# Patient Record
Sex: MALE | Race: WHITE | NOT HISPANIC OR LATINO | Employment: FULL TIME | URBAN - METROPOLITAN AREA
[De-identification: names, ages, dates, MRNs, and addresses within clinical notes are randomized per-mention and may not be internally consistent; named-entity substitution may affect disease eponyms.]

---

## 2024-03-26 ENCOUNTER — OFFICE VISIT (OUTPATIENT)
Dept: PRIMARY CARE | Facility: CLINIC | Age: 34
End: 2024-03-26
Payer: COMMERCIAL

## 2024-03-26 VITALS
OXYGEN SATURATION: 100 % | HEIGHT: 70 IN | RESPIRATION RATE: 16 BRPM | BODY MASS INDEX: 29.78 KG/M2 | WEIGHT: 208 LBS | HEART RATE: 61 BPM | DIASTOLIC BLOOD PRESSURE: 78 MMHG | SYSTOLIC BLOOD PRESSURE: 128 MMHG

## 2024-03-26 DIAGNOSIS — Z00.00 HEALTH CARE MAINTENANCE: Primary | ICD-10-CM

## 2024-03-26 PROCEDURE — 99395 PREV VISIT EST AGE 18-39: CPT | Performed by: INTERNAL MEDICINE

## 2024-03-26 PROCEDURE — 1036F TOBACCO NON-USER: CPT | Performed by: INTERNAL MEDICINE

## 2024-03-26 ASSESSMENT — ENCOUNTER SYMPTOMS
PALPITATIONS: 0
HEMATURIA: 0
DYSURIA: 0
CONSTIPATION: 0
NAUSEA: 0
NERVOUS/ANXIOUS: 0
POLYDIPSIA: 0
ARTHRALGIAS: 0
RHINORRHEA: 0
WOUND: 0
SORE THROAT: 0
POLYPHAGIA: 0
WHEEZING: 0
BLOOD IN STOOL: 0
ABDOMINAL PAIN: 0
UNEXPECTED WEIGHT CHANGE: 0
DIARRHEA: 0
SHORTNESS OF BREATH: 0
CHILLS: 0
HEADACHES: 0
FEVER: 0
VOMITING: 0
DYSPHORIC MOOD: 0
FREQUENCY: 0
CHEST TIGHTNESS: 0
COUGH: 0
MYALGIAS: 0
EYE PAIN: 0
DIZZINESS: 0

## 2024-03-26 ASSESSMENT — PATIENT HEALTH QUESTIONNAIRE - PHQ9
SUM OF ALL RESPONSES TO PHQ9 QUESTIONS 1 AND 2: 0
2. FEELING DOWN, DEPRESSED OR HOPELESS: NOT AT ALL
1. LITTLE INTEREST OR PLEASURE IN DOING THINGS: NOT AT ALL

## 2024-03-26 ASSESSMENT — PAIN SCALES - GENERAL: PAINLEVEL: 0-NO PAIN

## 2024-03-26 NOTE — PROGRESS NOTES
"Subjective   Patient ID: Warren Atkinson is a 33 y.o. male who presents for Annual Exam and Employment Physical (Forms provided-[employer]).    HPI     Dental visits- UTD  Eye visits-contacts, eye health normal    Exercise- [employer] a few days a week, bikes and walks  Diet- smoothies for breakfast, chicken and rice for lunch, dinner is protein and veggie    Alcohol use-2 drinks per week  Smoking-none     and [employer]    Contacts  Feels well  No concerns    He does a fitness test in the fall.  No orthopedics issues    Review of Systems   Constitutional:  Negative for chills, fever and unexpected weight change.   HENT:  Negative for congestion, hearing loss, rhinorrhea and sore throat.    Eyes:  Negative for pain and visual disturbance.   Respiratory:  Negative for cough, chest tightness, shortness of breath and wheezing.    Cardiovascular:  Negative for chest pain and palpitations.   Gastrointestinal:  Negative for abdominal pain, blood in stool, constipation, diarrhea, nausea and vomiting.   Endocrine: Negative for cold intolerance, heat intolerance, polydipsia and polyphagia.   Genitourinary:  Negative for dysuria, frequency and hematuria.   Musculoskeletal:  Negative for arthralgias and myalgias.   Skin:  Negative for rash and wound.   Neurological:  Negative for dizziness, syncope and headaches.   Psychiatric/Behavioral:  Negative for dysphoric mood. The patient is not nervous/anxious.        Objective   /78   Pulse 61   Resp 16   Ht 1.778 m (5' 10\")   Wt 94.3 kg (208 lb)   SpO2 100%   BMI 29.84 kg/m²     Physical Exam  Vitals reviewed.   Constitutional:       Appearance: Normal appearance. He is not ill-appearing.   HENT:      Head: Normocephalic and atraumatic.      Right Ear: Tympanic membrane normal.      Left Ear: Tympanic membrane normal.      Nose: Nose normal.      Mouth/Throat:      Mouth: Mucous membranes are moist.      Pharynx: Oropharynx is clear.   Eyes:      Extraocular Movements: Extraocular " movements intact.      Conjunctiva/sclera: Conjunctivae normal.      Pupils: Pupils are equal, round, and reactive to light.   Cardiovascular:      Rate and Rhythm: Normal rate and regular rhythm.   Pulmonary:      Effort: Pulmonary effort is normal.      Breath sounds: Normal breath sounds. No wheezing.   Abdominal:      General: There is no distension.      Palpations: Abdomen is soft. There is no mass.      Tenderness: There is no abdominal tenderness.   Musculoskeletal:         General: No swelling.      Cervical back: Neck supple.   Lymphadenopathy:      Cervical: No cervical adenopathy.   Neurological:      General: No focal deficit present.      Mental Status: He is alert and oriented to person, place, and time.      Gait: Gait normal.   Psychiatric:         Mood and Affect: Mood normal.         Behavior: Behavior normal.         Thought Content: Thought content normal.         Assessment/Plan   Problem List Items Addressed This Visit    None  Visit Diagnoses         Codes    Health care maintenance    -  Primary Z00.00    Relevant Orders    Comprehensive Metabolic Panel    Lipid Panel    CBC             CPE completed.  Advised to keep a heart healthy, low fat  diet with fruits and veggies like Mediterranean diet.  Advised on the importance of exercise and maintaining 150 minutes of exercise per week (30 minutes per day 5 days a week).  Advised on regular eye and dental visits.  Discussed age appropriate cancer screening, immunizations and recommendations given.  Discussed avoiding illicit drugs and tobacco. Advised on appropriate use of alcohol.  Advised to wear seat belt.    Formed completed  No concerns    He will send me his vaccines. 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

## 2024-05-13 ENCOUNTER — TELEPHONE (OUTPATIENT)
Dept: PRIMARY CARE | Facility: CLINIC | Age: 34
End: 2024-05-13
Payer: COMMERCIAL

## 2024-05-13 DIAGNOSIS — M25.519 SHOULDER PAIN, UNSPECIFIED CHRONICITY, UNSPECIFIED LATERALITY: Primary | ICD-10-CM

## 2024-05-13 NOTE — TELEPHONE ENCOUNTER
Called left message informed Dr. Guzman put in referral for orthopedic to see Dr. Charles, gave central scheduling number for him to schedule appointment.

## 2024-05-28 ENCOUNTER — OFFICE VISIT (OUTPATIENT)
Dept: ORTHOPEDIC SURGERY | Facility: CLINIC | Age: 34
End: 2024-05-28
Payer: COMMERCIAL

## 2024-05-28 ENCOUNTER — HOSPITAL ENCOUNTER (OUTPATIENT)
Dept: RADIOLOGY | Facility: CLINIC | Age: 34
Discharge: HOME | End: 2024-05-28
Payer: COMMERCIAL

## 2024-05-28 DIAGNOSIS — M25.519 SHOULDER PAIN, UNSPECIFIED CHRONICITY, UNSPECIFIED LATERALITY: ICD-10-CM

## 2024-05-28 DIAGNOSIS — M25.512 ACUTE PAIN OF LEFT SHOULDER: ICD-10-CM

## 2024-05-28 PROCEDURE — 73030 X-RAY EXAM OF SHOULDER: CPT | Mod: LEFT SIDE | Performed by: RADIOLOGY

## 2024-05-28 PROCEDURE — 73030 X-RAY EXAM OF SHOULDER: CPT | Mod: LT

## 2024-05-28 PROCEDURE — 99213 OFFICE O/P EST LOW 20 MIN: CPT | Performed by: ORTHOPAEDIC SURGERY

## 2024-05-28 PROCEDURE — 20610 DRAIN/INJ JOINT/BURSA W/O US: CPT | Performed by: ORTHOPAEDIC SURGERY

## 2024-05-28 PROCEDURE — 99214 OFFICE O/P EST MOD 30 MIN: CPT | Performed by: ORTHOPAEDIC SURGERY

## 2024-05-28 PROCEDURE — 2500000004 HC RX 250 GENERAL PHARMACY W/ HCPCS (ALT 636 FOR OP/ED): Performed by: ORTHOPAEDIC SURGERY

## 2024-05-28 PROCEDURE — 2500000005 HC RX 250 GENERAL PHARMACY W/O HCPCS: Performed by: ORTHOPAEDIC SURGERY

## 2024-05-28 RX ORDER — MELOXICAM 15 MG/1
15 TABLET ORAL
Qty: 30 TABLET | Refills: 2 | Status: SHIPPED | OUTPATIENT
Start: 2024-05-28

## 2024-05-28 RX ORDER — TRIAMCINOLONE ACETONIDE 40 MG/ML
40 INJECTION, SUSPENSION INTRA-ARTICULAR; INTRAMUSCULAR
Status: COMPLETED | OUTPATIENT
Start: 2024-05-28 | End: 2024-05-28

## 2024-05-28 RX ORDER — LIDOCAINE HYDROCHLORIDE 10 MG/ML
2 INJECTION INFILTRATION; PERINEURAL
Status: COMPLETED | OUTPATIENT
Start: 2024-05-28 | End: 2024-05-28

## 2024-05-28 RX ADMIN — TRIAMCINOLONE ACETONIDE 40 MG: 40 INJECTION, SUSPENSION INTRA-ARTICULAR; INTRAMUSCULAR at 11:07

## 2024-05-28 RX ADMIN — LIDOCAINE HYDROCHLORIDE 2 ML: 10 INJECTION, SOLUTION INFILTRATION; PERINEURAL at 11:07

## 2024-05-28 NOTE — PROGRESS NOTES
History of Present Illness   Patient presents today endorsing left shoulder pain.   The patient localizes the pain superior to the shoulder.  The pain is worse with overhead activity and tends to wake the patient at night.  The patient denies a traumatic injury.    The pain is mild, achy at baseline but exacerbated with activity causing a sharp or acute pain, better with rest.    He works for the [employer], does have a fitness test that needs to be done by September.  Likes to workout.    Past Medical History:   Diagnosis Date    Eczema      Past Surgical History:   Procedure Laterality Date    WISDOM TOOTH EXTRACTION       No current outpatient medications on file.    Review of Systems:   GENERAL: Negative for malaise, significant weight loss, fever  MUSCULOSKELETAL: see HPI  NEURO:  Negative    Physical Examination:  Skin healthy to gross inspection  No ecchymosis, no swelling, no gross atrophy  + Tenderness to palpation over acromioclavicular joint  No tenderness to palpation over biceps tendon  No tenderness to palpation over the cervical spine   ROM:  Full forward flexion  Full external rotation  IR to thoracic spine, symmetric to contralateral side  Strength:  5/5 Resisted elevation  5/5 Resisted external rotation    Negative lift off test   Negative Spurling´s test  Negative Neer and Hawkin´s test  + O´ Vinnie´s test, localizes superiorly  Neurovascular exam normal distally      Imaging:  Radiographs demonstrate healthy joint spaces with no evidence of significant degenerative changes of the glenohumeral joint or fractures, AC joint arthrosis noted    Assessment:  Patient with left shoulder pain, acromioclavicular joint arthrosis     Plan:  Acromioclavicular joint arthrosis was discussed. The pertinent anatomy was reviewed.  The role of CSI / PT/ medications/ activity modification were discussed.  Distal clavicle excision was mentioned for refractory cases.  Mobic prescribed, injection  given discussed MRI if he fails improve lower suspicion for a SLAP tear.    A nonsteroidal anti-inflammatory drug (NSAID) was prescribed.  We reviewed the risks (including gastric issues, renal issues) and benefits of the medication.  We discussed a scheduled course if no adverse reactions to help with swelling / pain.  We discussed that chronic usage should be checked with primary care physician.      L Inj/Asp: L subacromial bursa on 5/28/2024 11:07 AM  Indications: pain  Details: 22 G needle, posterior approach  Medications: 2 mL lidocaine 10 mg/mL (1 %); 40 mg triamcinolone acetonide 40 mg/mL  Outcome: tolerated well, no immediate complications  Procedure, treatment alternatives, risks and benefits explained, specific risks discussed. Consent was given by the patient. Immediately prior to procedure a time out was called to verify the correct patient, procedure, equipment, support staff and site/side marked as required. Patient was prepped and draped in the usual sterile fashion.

## 2024-09-19 ENCOUNTER — HOSPITAL ENCOUNTER (OUTPATIENT)
Dept: RADIOLOGY | Facility: CLINIC | Age: 34
Discharge: HOME | End: 2024-09-19
Payer: COMMERCIAL

## 2024-09-19 ENCOUNTER — OFFICE VISIT (OUTPATIENT)
Dept: ORTHOPEDIC SURGERY | Facility: CLINIC | Age: 34
End: 2024-09-19
Payer: COMMERCIAL

## 2024-09-19 VITALS — WEIGHT: 190 LBS | HEIGHT: 70 IN | BODY MASS INDEX: 27.2 KG/M2

## 2024-09-19 DIAGNOSIS — M25.532 LEFT WRIST PAIN: ICD-10-CM

## 2024-09-19 PROCEDURE — 2500000004 HC RX 250 GENERAL PHARMACY W/ HCPCS (ALT 636 FOR OP/ED): Performed by: ORTHOPAEDIC SURGERY

## 2024-09-19 PROCEDURE — 99214 OFFICE O/P EST MOD 30 MIN: CPT | Performed by: ORTHOPAEDIC SURGERY

## 2024-09-19 PROCEDURE — 73110 X-RAY EXAM OF WRIST: CPT | Mod: LEFT SIDE | Performed by: ORTHOPAEDIC SURGERY

## 2024-09-19 PROCEDURE — 73110 X-RAY EXAM OF WRIST: CPT | Mod: LT

## 2024-09-19 PROCEDURE — 20605 DRAIN/INJ JOINT/BURSA W/O US: CPT | Mod: LT | Performed by: ORTHOPAEDIC SURGERY

## 2024-09-19 PROCEDURE — 3008F BODY MASS INDEX DOCD: CPT | Performed by: ORTHOPAEDIC SURGERY

## 2024-09-19 PROCEDURE — 1036F TOBACCO NON-USER: CPT | Performed by: ORTHOPAEDIC SURGERY

## 2024-09-19 PROCEDURE — 2500000005 HC RX 250 GENERAL PHARMACY W/O HCPCS: Performed by: ORTHOPAEDIC SURGERY

## 2024-09-19 RX ORDER — LIDOCAINE HYDROCHLORIDE 10 MG/ML
1 INJECTION, SOLUTION INFILTRATION; PERINEURAL
Status: COMPLETED | OUTPATIENT
Start: 2024-09-19 | End: 2024-09-19

## 2024-09-19 NOTE — PROGRESS NOTES
M Inj/Asp: L radiocarpal on 9/19/2024 11:17 AM  Indications: pain  Details: 25 G needle, dorsal approach  Medications: 1 mL lidocaine 10 mg/mL (1 %); 10 mg triamcinolone acetonide 10 mg/mL  Outcome: tolerated well, no immediate complications    Left Radiocarpal Joint Injection: It was explained to the patient that the risks of a steroid injection include but are not limited to infection, local skin irritation, skin atrophy, calcification, continued pain and discomfort, elevation of blood sugar, burning, failure to relieve pain, and possible late infection. The patient verbalized good insight and verbalized consent for the injection. It was further explained that post injection discomfort can be alleviated with additional medications, ice, elevation, and rest over the first 24 hours, and these modalities are recommended.     Using aseptic technique, a solution containing 10 mg of Kenalog and 1 mL of 1% lidocaine without epinephrine was injected into the patient´s left radiocarpal joint. This was done by digitally palpating Guido´s tubercle. We then moved 1 cm distal to this bony landmark and palpated the soft spot in the location of the typical 3-4 arthroscopic portal. We then prepped the skin and advanced a 25-gauge needle into the joint. The solution was then administered and the patient tolerated this well. A band-aid was placed. It should be noted that ethyl chloride spray was used to make the injection delivery more comfortable for the patient.  Procedure, treatment alternatives, risks and benefits explained, specific risks discussed. Consent was given by the patient. Immediately prior to procedure a time out was called to verify the correct patient, procedure, equipment, support staff and site/side marked as required. Patient was prepped and draped in the usual sterile fashion.

## 2024-09-19 NOTE — PROGRESS NOTES
History present illness: Patient presents today for evaluation of the left wrist.  He describes return of painful left wrist worsened with positions of wrist extension.  He localizes to the TFCC.  Previous steroid injection intra-articular to the left wrist infiltrating about TFCC about complete relief.      Past medical history: The patient's past medical history, family history, social history, and review of systems were documented on the patient medical intake.  The updated data was reviewed in the electronic medical record.  History is negative except otherwise stated in history of present illness.        Physical examination:  General: Alert and oriented to person, place, and time.  No acute distress and breathing comfortably: Pleasant and cooperative with examination.  HEENT: Head is normocephalic and atraumatic.  Neck: Supple, no visible swelling.  Cardiovascular: No palpable tachycardia  Lungs: No audible wheezing or labored breathing  Abdomen: Nondistended.  Extremities: Evaluation of the left upper extremity finds the patient had palpable radial artery at the wrist with brisk capillary refill to all digits.  Patient has intact sensation to axillary radial median and ulnar nerves.  There are no open wounds.  There are no signs of infection.  There is no evidence of lymphedema or lymphatic streaking.  The patient has supple compartments to left arm forearm and hand.  Tenderness to left wrist about TFCC ECU and FCU.      Radiology:      Assessment: Left wrist pain      Plan: Treatment options were discussed.  Patient elects to trial what worked last time.  He elects for intra-articular steroid injection to the ulnar aspect of left wrist infiltrating about TFCC.  Follow-up with me in 6 weeks.  No x-rays upon return.        Procedure:  M Inj/Asp: L radiocarpal on 9/19/2024 12:47 PM  Indications: pain  Details: 25 G needle, dorsal approach  Medications: 1 mL lidocaine 10 mg/mL (1 %); 10 mg triamcinolone  acetonide 10 mg/mL  Outcome: tolerated well, no immediate complications    Left Radiocarpal Joint Injection: It was explained to the patient that the risks of a steroid injection include but are not limited to infection, local skin irritation, skin atrophy, calcification, continued pain and discomfort, elevation of blood sugar, burning, failure to relieve pain, and possible late infection. The patient verbalized good insight and verbalized consent for the injection. It was further explained that post injection discomfort can be alleviated with additional medications, ice, elevation, and rest over the first 24 hours, and these modalities are recommended.     Using aseptic technique, a solution containing 10 mg of Kenalog and 1 mL of 1% lidocaine without epinephrine was injected into the patient´s left radiocarpal joint. This was done by digitally palpating Guido´s tubercle. We then moved 1 cm distal to this bony landmark and palpated the soft spot in the location of the typical 3-4 arthroscopic portal. We then prepped the skin and advanced a 25-gauge needle into the joint. The solution was then administered and the patient tolerated this well. A band-aid was placed. It should be noted that ethyl chloride spray was used to make the injection delivery more comfortable for the patient.  Procedure, treatment alternatives, risks and benefits explained, specific risks discussed. Consent was given by the patient. Immediately prior to procedure a time out was called to verify the correct patient, procedure, equipment, support staff and site/side marked as required. Patient was prepped and draped in the usual sterile fashion.

## 2024-10-21 ENCOUNTER — OFFICE VISIT (OUTPATIENT)
Dept: URGENT CARE | Age: 34
End: 2024-10-21
Payer: COMMERCIAL

## 2024-10-21 ENCOUNTER — ANCILLARY PROCEDURE (OUTPATIENT)
Dept: URGENT CARE | Age: 34
End: 2024-10-21
Payer: COMMERCIAL

## 2024-10-21 VITALS
TEMPERATURE: 98.2 F | WEIGHT: 190 LBS | BODY MASS INDEX: 27.2 KG/M2 | OXYGEN SATURATION: 99 % | SYSTOLIC BLOOD PRESSURE: 118 MMHG | DIASTOLIC BLOOD PRESSURE: 82 MMHG | HEART RATE: 55 BPM | HEIGHT: 70 IN | RESPIRATION RATE: 18 BRPM

## 2024-10-21 DIAGNOSIS — M79.671 FOOT PAIN, RIGHT: ICD-10-CM

## 2024-10-21 DIAGNOSIS — M77.41 METATARSALGIA OF RIGHT FOOT: ICD-10-CM

## 2024-10-21 DIAGNOSIS — M79.671 FOOT PAIN, RIGHT: Primary | ICD-10-CM

## 2024-10-21 PROCEDURE — 73630 X-RAY EXAM OF FOOT: CPT | Performed by: PHYSICIAN ASSISTANT

## 2024-10-21 PROCEDURE — 1036F TOBACCO NON-USER: CPT | Performed by: PHYSICIAN ASSISTANT

## 2024-10-21 PROCEDURE — 99204 OFFICE O/P NEW MOD 45 MIN: CPT | Performed by: PHYSICIAN ASSISTANT

## 2024-10-21 PROCEDURE — 3008F BODY MASS INDEX DOCD: CPT | Performed by: PHYSICIAN ASSISTANT

## 2024-10-21 RX ORDER — PREDNISONE 20 MG/1
20 TABLET ORAL 2 TIMES DAILY
Qty: 10 TABLET | Refills: 0 | Status: SHIPPED | OUTPATIENT
Start: 2024-10-21 | End: 2024-10-26

## 2024-10-21 ASSESSMENT — PAIN SCALES - GENERAL: PAINLEVEL_OUTOF10: 5

## 2024-10-21 NOTE — PROGRESS NOTES
"Subjective   Patient ID: Warren Atkinson is a 34 y.o. male. They present today with a chief complaint of Foot Pain (Right foot pain 2 weeks on and off. No known injury).    History of Present Illness  This is a 34-year-old male who presents to the urgent care with complaints of right foot pain.  Patient states that is been happening on and off for the past few weeks.  Patient denies any direct injury or trauma to the area.  Patient denies any rash or swelling.  Patient states the pain is located on the ball/plantar aspect of his right foot.  Patient states nothing helps with his symptoms.  Patient denies any other systemic complaints.          Past Medical History  Allergies as of 10/21/2024    (No Known Allergies)       (Not in a hospital admission)       Past Medical History:   Diagnosis Date    Eczema        Past Surgical History:   Procedure Laterality Date    WISDOM TOOTH EXTRACTION          reports that he has never smoked. He has never used smokeless tobacco. He reports current alcohol use of about 3.0 standard drinks of alcohol per week. He reports that he does not use drugs.    Review of Systems  Review of Systems   All other systems reviewed and are negative.                                 Objective    Vitals:    10/21/24 0831   BP: 118/82   BP Location: Left arm   Patient Position: Sitting   BP Cuff Size: Adult   Pulse: 55   Resp: 18   Temp: 36.8 °C (98.2 °F)   TempSrc: Oral   SpO2: 99%   Weight: 86.2 kg (190 lb)   Height: 1.778 m (5' 10\")     No LMP for male patient.    Physical Exam  Vitals and nursing note reviewed.   Constitutional:       Appearance: Normal appearance.   Eyes:      Extraocular Movements: Extraocular movements intact.   Pulmonary:      Comments: No dyspnea  Musculoskeletal:         General: Tenderness present. No swelling, deformity or signs of injury. Normal range of motion.      Cervical back: Neck supple.      Right lower leg: No edema.      Comments: Point tenderness over distal " metatarsals   Skin:     General: Skin is warm and dry.      Capillary Refill: Capillary refill takes less than 2 seconds.   Neurological:      General: No focal deficit present.      Mental Status: He is alert.         Procedures    Point of Care Test & Imaging Results from this visit  No results found for this visit on 10/21/24.   XR foot right 3+ views    Result Date: 10/21/2024  Interpreted By:  Caleb Dewitt, STUDY: XR FOOT RIGHT 3+ VIEWS; ;  10/21/2024 8:52 am   INDICATION: Signs/Symptoms:right foot pain, over distal metatarsals.   COMPARISON: March 2021.   ACCESSION NUMBER(S): [id number]   ORDERING CLINICIAN: LORNA TRIVEDI   FINDINGS: No fractures or destructive lesions are identified. The joint spaces and articular surfaces are maintained. The alignment is anatomic. The soft tissues are unremarkable.       Normal radiographs of the right foot.   Signed by: Caleb Dewitt 10/21/2024 9:01 AM Dictation workstation:   [id number]     Diagnostic study results (if any) were reviewed by Lorna Trivedi PA-C.    Assessment/Plan   Allergies, medications, history, and pertinent labs/EKGs/Imaging reviewed by Lorna Trivedi PA-C.     Medical Decision Making  R foot pain - metatarsalgia - point tenderness over plantar aspect of R foot. - no injury or trauma, +overuse. Xray imaging ordered and was negative for acute fx/dislocation. No focal neuro deficits. No rash or laceration. Anti-inflam prescribed and podiatry referral provided. Care instructions/red flags, return precautions & ADEs discussed when to seek medical attention in clinic vs ER and pt agreed/understood. Follow up with primary care provider in 3-5 days if no improvement.       Orders and Diagnoses  Diagnoses and all orders for this visit:  Foot pain, right  -     XR foot right 3+ views; Future  -     Referral to Podiatry; Future  Metatarsalgia of right foot  -     predniSONE (Deltasone) 20 mg tablet; Take 1 tablet (20 mg) by mouth 2 times a  day for 5 days.      Medical Admin Record      Patient disposition: Home    Electronically signed by Jelena Trivedi PA-C  9:14 AM

## 2024-10-21 NOTE — PATIENT INSTRUCTIONS
Take prednisone as directed, follow up with podiatry, Care instructions/red flags, return precautions & ADEs discussed when to seek medical attention in clinic vs ER and pt agreed/understood. Follow up with primary care provider in 3-5 days if no improvement.

## 2024-10-31 ENCOUNTER — OFFICE VISIT (OUTPATIENT)
Dept: ORTHOPEDIC SURGERY | Facility: CLINIC | Age: 34
End: 2024-10-31
Payer: COMMERCIAL

## 2024-10-31 DIAGNOSIS — M77.8 WRIST TENDONITIS: Primary | ICD-10-CM

## 2024-10-31 PROCEDURE — 20550 NJX 1 TENDON SHEATH/LIGAMENT: CPT | Performed by: ORTHOPAEDIC SURGERY

## 2024-10-31 PROCEDURE — 99213 OFFICE O/P EST LOW 20 MIN: CPT | Performed by: ORTHOPAEDIC SURGERY

## 2024-10-31 PROCEDURE — 99214 OFFICE O/P EST MOD 30 MIN: CPT | Performed by: ORTHOPAEDIC SURGERY

## 2024-10-31 PROCEDURE — 2500000004 HC RX 250 GENERAL PHARMACY W/ HCPCS (ALT 636 FOR OP/ED): Performed by: ORTHOPAEDIC SURGERY

## 2024-10-31 PROCEDURE — 1036F TOBACCO NON-USER: CPT | Performed by: ORTHOPAEDIC SURGERY

## 2024-10-31 RX ORDER — LIDOCAINE HYDROCHLORIDE 10 MG/ML
1 INJECTION, SOLUTION INFILTRATION; PERINEURAL
Status: COMPLETED | OUTPATIENT
Start: 2024-10-31 | End: 2024-10-31

## 2025-01-06 ENCOUNTER — APPOINTMENT (OUTPATIENT)
Dept: ORTHOPEDIC SURGERY | Facility: CLINIC | Age: 35
End: 2025-01-06
Payer: COMMERCIAL

## 2025-01-16 ENCOUNTER — LAB (OUTPATIENT)
Dept: LAB | Facility: LAB | Age: 35
End: 2025-01-16
Payer: COMMERCIAL

## 2025-01-16 DIAGNOSIS — Z00.00 HEALTH CARE MAINTENANCE: ICD-10-CM

## 2025-01-16 LAB
ALBUMIN SERPL BCP-MCNC: 4.8 G/DL (ref 3.4–5)
ALP SERPL-CCNC: 37 U/L (ref 33–120)
ALT SERPL W P-5'-P-CCNC: 21 U/L (ref 10–52)
ANION GAP SERPL CALC-SCNC: 12 MMOL/L (ref 10–20)
AST SERPL W P-5'-P-CCNC: 24 U/L (ref 9–39)
BILIRUB SERPL-MCNC: 0.6 MG/DL (ref 0–1.2)
BUN SERPL-MCNC: 26 MG/DL (ref 6–23)
CALCIUM SERPL-MCNC: 9.5 MG/DL (ref 8.6–10.6)
CHLORIDE SERPL-SCNC: 103 MMOL/L (ref 98–107)
CHOLEST SERPL-MCNC: 187 MG/DL (ref 0–199)
CHOLESTEROL/HDL RATIO: 3.1
CO2 SERPL-SCNC: 29 MMOL/L (ref 21–32)
CREAT SERPL-MCNC: 0.85 MG/DL (ref 0.5–1.3)
EGFRCR SERPLBLD CKD-EPI 2021: >90 ML/MIN/1.73M*2
ERYTHROCYTE [DISTWIDTH] IN BLOOD BY AUTOMATED COUNT: 11.8 % (ref 11.5–14.5)
GLUCOSE SERPL-MCNC: 78 MG/DL (ref 74–99)
HCT VFR BLD AUTO: 43.9 % (ref 41–52)
HDLC SERPL-MCNC: 61.1 MG/DL
HGB BLD-MCNC: 14.8 G/DL (ref 13.5–17.5)
LDLC SERPL CALC-MCNC: 112 MG/DL
MCH RBC QN AUTO: 30.1 PG (ref 26–34)
MCHC RBC AUTO-ENTMCNC: 33.7 G/DL (ref 32–36)
MCV RBC AUTO: 89 FL (ref 80–100)
NON HDL CHOLESTEROL: 126 MG/DL (ref 0–149)
NRBC BLD-RTO: 0 /100 WBCS (ref 0–0)
PLATELET # BLD AUTO: 244 X10*3/UL (ref 150–450)
POTASSIUM SERPL-SCNC: 4.4 MMOL/L (ref 3.5–5.3)
PROT SERPL-MCNC: 7.2 G/DL (ref 6.4–8.2)
RBC # BLD AUTO: 4.91 X10*6/UL (ref 4.5–5.9)
SODIUM SERPL-SCNC: 140 MMOL/L (ref 136–145)
TRIGL SERPL-MCNC: 68 MG/DL (ref 0–149)
VLDL: 14 MG/DL (ref 0–40)
WBC # BLD AUTO: 3.7 X10*3/UL (ref 4.4–11.3)

## 2025-01-16 PROCEDURE — 80053 COMPREHEN METABOLIC PANEL: CPT

## 2025-01-16 PROCEDURE — 80061 LIPID PANEL: CPT

## 2025-01-16 PROCEDURE — 85027 COMPLETE CBC AUTOMATED: CPT

## 2025-02-24 ENCOUNTER — OFFICE VISIT (OUTPATIENT)
Dept: ORTHOPEDIC SURGERY | Facility: CLINIC | Age: 35
End: 2025-02-24
Payer: COMMERCIAL

## 2025-02-24 DIAGNOSIS — M25.519 SHOULDER PAIN, UNSPECIFIED CHRONICITY, UNSPECIFIED LATERALITY: Primary | ICD-10-CM

## 2025-02-24 DIAGNOSIS — M89.8X1 PAIN IN SCAPULA: ICD-10-CM

## 2025-02-24 PROCEDURE — 20610 DRAIN/INJ JOINT/BURSA W/O US: CPT | Mod: LT | Performed by: ORTHOPAEDIC SURGERY

## 2025-02-24 PROCEDURE — 99212 OFFICE O/P EST SF 10 MIN: CPT | Mod: 25 | Performed by: ORTHOPAEDIC SURGERY

## 2025-02-24 PROCEDURE — 99214 OFFICE O/P EST MOD 30 MIN: CPT | Performed by: ORTHOPAEDIC SURGERY

## 2025-02-24 PROCEDURE — 1036F TOBACCO NON-USER: CPT | Performed by: ORTHOPAEDIC SURGERY

## 2025-02-24 PROCEDURE — 2500000004 HC RX 250 GENERAL PHARMACY W/ HCPCS (ALT 636 FOR OP/ED): Performed by: ORTHOPAEDIC SURGERY

## 2025-02-24 RX ORDER — LIDOCAINE HYDROCHLORIDE 10 MG/ML
2 INJECTION, SOLUTION INFILTRATION; PERINEURAL
Status: COMPLETED | OUTPATIENT
Start: 2025-02-24 | End: 2025-02-24

## 2025-02-24 RX ORDER — TRIAMCINOLONE ACETONIDE 40 MG/ML
40 INJECTION, SUSPENSION INTRA-ARTICULAR; INTRAMUSCULAR
Status: COMPLETED | OUTPATIENT
Start: 2025-02-24 | End: 2025-02-24

## 2025-02-24 RX ADMIN — LIDOCAINE HYDROCHLORIDE 2 ML: 10 INJECTION, SOLUTION INFILTRATION; PERINEURAL at 09:28

## 2025-02-24 RX ADMIN — TRIAMCINOLONE ACETONIDE 40 MG: 40 INJECTION, SUSPENSION INTRA-ARTICULAR; INTRAMUSCULAR at 09:28

## 2025-02-24 NOTE — PROGRESS NOTES
History of Present Illness   Patient returns today endorsing left shoulder pain.   The patient localizes the pain superior to the shoulder.  He did well with an injection last spring  The pain is worse with overhead activity and tends to wake the patient at night.  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  He has also been noting some scapular pain    Review of Systems:   GENERAL: Negative for malaise, significant weight loss, fever  MUSCULOSKELETAL: see HPI  NEURO:  Negative    Physical Examination:  Skin healthy to gross inspection  No ecchymosis, no swelling, no gross atrophy  + Tenderness to palpation over acromioclavicular joint  No tenderness to palpation over biceps tendon      Negative lift off test   Negative Spurling´s test  Negative Neer and Hawkin´s test  + O´ Vinnie´s test, localizes superiorly  Neurovascular exam normal distally      Assessment:  Patient with left shoulder pain, acromioclavicular joint arthrosis     Plan:  Acromioclavicular joint arthrosis was discussed. The pertinent anatomy was reviewed.  The role of CSI / PT/ medications/ activity modification were discussed.  Distal clavicle excision was mentioned for refractory cases.    He did well with an injection the last visit we repeated the injection today.  Discussed the possibility of MR arthrogram if symptoms do not improve.  Also provided an order for physical therapy as he has some scapular/posterior pain likely secondary in etiology.  L Inj/Asp: L subacromial bursa on 2/24/2025 9:28 AM  Indications: pain  Details: 22 G needle, posterior approach  Medications: 2 mL lidocaine 10 mg/mL (1 %); 40 mg triamcinolone acetonide 40 mg/mL  Outcome: tolerated well, no immediate complications  Procedure, treatment alternatives, risks and benefits explained, specific risks discussed. Consent was given by the patient. Immediately prior to procedure a time out was called to verify the correct patient,  procedure, equipment, support staff and site/side marked as required. Patient was prepped and draped in the usual sterile fashion.

## 2025-04-10 ENCOUNTER — OFFICE VISIT (OUTPATIENT)
Dept: URGENT CARE | Age: 35
End: 2025-04-10
Payer: COMMERCIAL

## 2025-04-10 VITALS
OXYGEN SATURATION: 97 % | WEIGHT: 190 LBS | TEMPERATURE: 98.3 F | RESPIRATION RATE: 21 BRPM | DIASTOLIC BLOOD PRESSURE: 80 MMHG | BODY MASS INDEX: 27.2 KG/M2 | HEART RATE: 63 BPM | SYSTOLIC BLOOD PRESSURE: 125 MMHG | HEIGHT: 70 IN

## 2025-04-10 DIAGNOSIS — J02.9 SORE THROAT: ICD-10-CM

## 2025-04-10 DIAGNOSIS — R05.3 PERSISTENT COUGH: Primary | ICD-10-CM

## 2025-04-10 LAB
POC HUMAN RHINOVIRUS PCR: NEGATIVE
POC INFLUENZA A VIRUS PCR: NEGATIVE
POC INFLUENZA B VIRUS PCR: NEGATIVE
POC RESPIRATORY SYNCYTIAL VIRUS PCR: NEGATIVE
POC STREPTOCOCCUS PYOGENES (GROUP A STREP) PCR: NEGATIVE

## 2025-04-10 RX ORDER — AMOXICILLIN AND CLAVULANATE POTASSIUM 875; 125 MG/1; MG/1
875 TABLET, FILM COATED ORAL 2 TIMES DAILY
Qty: 14 TABLET | Refills: 0 | Status: SHIPPED | OUTPATIENT
Start: 2025-04-10 | End: 2025-04-17

## 2025-04-10 RX ORDER — BENZONATATE 200 MG/1
200 CAPSULE ORAL 3 TIMES DAILY PRN
Qty: 21 CAPSULE | Refills: 0 | Status: SHIPPED | OUTPATIENT
Start: 2025-04-10 | End: 2025-04-17

## 2025-04-10 ASSESSMENT — PAIN SCALES - GENERAL: PAINLEVEL_OUTOF10: 5

## 2025-04-10 ASSESSMENT — ENCOUNTER SYMPTOMS: SORE THROAT: 1

## 2025-04-10 NOTE — PROGRESS NOTES
"Subjective   Patient ID: Warren Atkinson is a 34 y.o. male. They present today with a chief complaint of Sore Throat (Scratchy and hard to swallow at times.  Has chills too for 2-3 days).    History of Present Illness    Sore Throat       34-year-old patient presents to clinic with complaints of sore throat with associated chills and nasal congestion, worsening cough cough, sinus pressure ongoing for the past 2 to 3 days which initially started about 2 weeks ago with upper respiratory symptoms which moved down to the chest and then moved up to the sinuses and was improving until about 2 to 3 days ago when current symptoms developed.  Reports has tried over-the-counter cold medications with minimal relief.  Reports the pain is worse with swallowing.  Reports pain is sharp and moderate. Denies shortness of breath, chest pain, dizziness,  nausea, vomiting, abdominal pain, diarrhea.       Past Medical History  Allergies as of 04/10/2025    (No Known Allergies)       (Not in a hospital admission)       Past Medical History:   Diagnosis Date    Eczema        Past Surgical History:   Procedure Laterality Date    WISDOM TOOTH EXTRACTION          reports that he has never smoked. He has never used smokeless tobacco. He reports current alcohol use of about 3.0 standard drinks of alcohol per week. He reports that he does not use drugs.    Review of Systems  ROS negative with the exception as noted on HPI                                Objective    Vitals:    04/10/25 1537   BP: 125/80   Pulse: 63   Resp: 21   Temp: 36.8 °C (98.3 °F)   TempSrc: Oral   SpO2: 97%   Weight: 86.2 kg (190 lb)   Height: 1.778 m (5' 10\")     No LMP for male patient.    Physical Exam  Constitutional:       Appearance: Normal appearance.   HENT:      Head: Normocephalic and atraumatic.      Right Ear: Tympanic membrane, ear canal and external ear normal.      Left Ear: Tympanic membrane, ear canal and external ear normal.      Nose: Mucosal edema (and " erythema) present. No rhinorrhea.      Right Sinus: No maxillary sinus tenderness or frontal sinus tenderness.      Left Sinus: No maxillary sinus tenderness or frontal sinus tenderness.      Mouth/Throat:      Lips: Pink.      Mouth: Mucous membranes are moist. No oral lesions.      Dentition: Normal dentition. No gingival swelling.      Tongue: No lesions. Tongue does not deviate from midline.      Palate: No mass and lesions.      Pharynx: Posterior oropharyngeal erythema and postnasal drip present. No pharyngeal swelling or uvula swelling.   Cardiovascular:      Rate and Rhythm: Normal rate and regular rhythm.      Heart sounds: No murmur heard.  Pulmonary:      Effort: Pulmonary effort is normal. No respiratory distress.      Breath sounds: Normal breath sounds. No stridor. No wheezing, rhonchi or rales.   Lymphadenopathy:      Cervical: Cervical adenopathy present.   Neurological:      Mental Status: He is alert.         Procedures    Point of Care Test & Imaging Results from this visit  Results for orders placed or performed in visit on 04/10/25   POCT SPOTFIRE R/ST Panel Mini w/Strep A ([facility]) manually resulted   Result Value Ref Range    POC Group A Strep, PCR Negative Negative    POC Respiratory Syncytial Virus PCR Negative Negative    POC Influenza A Virus PCR Negative Negative    POC Influenza B Virus PCR Negative Negative    POC Human Rhinovirus PCR Negative Negative      Imaging  No results found.    Cardiology, Vascular, and Other Imaging  No other imaging results found for the past 2 days      Diagnostic study results (if any) were reviewed by Bernadette Valverde PA-C.    Assessment/Plan   Allergies, medications, history, and pertinent labs/EKGs/Imaging reviewed by Bernadette Valverde PA-C.   sore throat with associated chills and nasal congestion, worsening cough cough, sinus pressure ongoing for the past 2 to 3 days which initially started about 2 weeks ago with upper respiratory symptoms  which moved down to the chest and then moved up to the sinuses and was improving until about 2 to 3 days ago when current symptoms developed. Advised to drink plenty of fluids, run a cool-mist humidifier in room at night, and get plenty of rest. Pt. is advised to take 10 deep breaths and hours and continue to walk around every couple of hours. Patient should avoid over-exertion and reduce exposure to irritants such as smoke, cold, dry air, and dust. Patient may take acetaminophen or ibuprofen as directed to reduce fever and body aches.  Risk, benefits, and potential side effects of medication(s) discussed with pt. Discussed disease/illness presentation, treatment options, progression, complications, and outcomes with patient. Pt. Has expressed understanding and is an agreement of plan of care.    Medical Decision Making      Orders and Diagnoses  Diagnoses and all orders for this visit:  Persistent cough  -     amoxicillin-pot clavulanate (Augmentin) 875-125 mg tablet; Take 1 tablet (875 mg) by mouth 2 times a day for 7 days.  -     benzonatate (Tessalon) 200 mg capsule; Take 1 capsule (200 mg) by mouth 3 times a day as needed for cough for up to 7 days. Do not crush or chew.  Sore throat  -     POCT SPOTFIRE R/ST Panel Mini w/Strep A ([facility]) manually resulted      Medical Admin Record      Patient disposition: Home    Electronically signed by Bernadette Valverde PA-C  4:09 PM

## 2025-06-02 ASSESSMENT — PROMIS GLOBAL HEALTH SCALE
EMOTIONAL_PROBLEMS: NEVER
RATE_MENTAL_HEALTH: EXCELLENT
CARRYOUT_PHYSICAL_ACTIVITIES: COMPLETELY
RATE_QUALITY_OF_LIFE: EXCELLENT
RATE_PHYSICAL_HEALTH: VERY GOOD
RATE_GENERAL_HEALTH: VERY GOOD
RATE_AVERAGE_PAIN: 0
CARRYOUT_SOCIAL_ACTIVITIES: EXCELLENT
RATE_SOCIAL_SATISFACTION: EXCELLENT

## 2025-06-03 ENCOUNTER — APPOINTMENT (OUTPATIENT)
Dept: PRIMARY CARE | Facility: CLINIC | Age: 35
End: 2025-06-03
Payer: COMMERCIAL

## 2025-06-03 VITALS
BODY MASS INDEX: 28.26 KG/M2 | DIASTOLIC BLOOD PRESSURE: 66 MMHG | WEIGHT: 197.4 LBS | HEIGHT: 70 IN | HEART RATE: 56 BPM | OXYGEN SATURATION: 98 % | SYSTOLIC BLOOD PRESSURE: 96 MMHG

## 2025-06-03 DIAGNOSIS — Z00.00 HEALTH CARE MAINTENANCE: Primary | ICD-10-CM

## 2025-06-03 DIAGNOSIS — Z13.6 SCREENING FOR CARDIOVASCULAR CONDITION: ICD-10-CM

## 2025-06-03 DIAGNOSIS — D72.819 LEUKOPENIA, UNSPECIFIED TYPE: ICD-10-CM

## 2025-06-03 PROCEDURE — 1036F TOBACCO NON-USER: CPT | Performed by: INTERNAL MEDICINE

## 2025-06-03 PROCEDURE — 99395 PREV VISIT EST AGE 18-39: CPT | Performed by: INTERNAL MEDICINE

## 2025-06-03 PROCEDURE — 3008F BODY MASS INDEX DOCD: CPT | Performed by: INTERNAL MEDICINE

## 2025-06-03 ASSESSMENT — ENCOUNTER SYMPTOMS
DIZZINESS: 0
FREQUENCY: 0
WHEEZING: 0
RHINORRHEA: 0
DIARRHEA: 0
HEADACHES: 0
UNEXPECTED WEIGHT CHANGE: 0
BLOOD IN STOOL: 0
POLYDIPSIA: 0
DYSURIA: 0
COUGH: 0
DYSPHORIC MOOD: 0
PALPITATIONS: 0
ABDOMINAL PAIN: 0
MYALGIAS: 0
EYE PAIN: 0
SHORTNESS OF BREATH: 0
WOUND: 0
CHILLS: 0
CONSTIPATION: 0
CHEST TIGHTNESS: 0
NERVOUS/ANXIOUS: 0
NAUSEA: 0
FEVER: 0
POLYPHAGIA: 0
SORE THROAT: 0
HEMATURIA: 0
ARTHRALGIAS: 0
VOMITING: 0

## 2025-06-03 NOTE — PROGRESS NOTES
"Subjective   Patient ID: Warren Atkinson is a 34 y.o. male who presents for Annual Exam.    HPI     Dental visits- UTD  Eye visits- UTD    Exercise- OT 1-2x per week, run, bike  Diet-smoothie, protein shake, protein for lunch and dinner and side  States could be more veggies and fruits    Alcohol use-1x per week  Smoking-none    Did have AC joint inflammation--all resolved. Had some injections    Feels well  No concerns      Review of Systems   Constitutional:  Negative for chills, fever and unexpected weight change.   HENT:  Negative for congestion, hearing loss, rhinorrhea and sore throat.    Eyes:  Negative for pain and visual disturbance.   Respiratory:  Negative for cough, chest tightness, shortness of breath and wheezing.    Cardiovascular:  Negative for chest pain and palpitations.   Gastrointestinal:  Negative for abdominal pain, blood in stool, constipation, diarrhea, nausea and vomiting.   Endocrine: Negative for cold intolerance, heat intolerance, polydipsia and polyphagia.   Genitourinary:  Negative for dysuria, frequency and hematuria.   Musculoskeletal:  Negative for arthralgias and myalgias.   Skin:  Negative for rash and wound.   Neurological:  Negative for dizziness, syncope and headaches.   Psychiatric/Behavioral:  Negative for dysphoric mood. The patient is not nervous/anxious.        Objective   BP 96/66 (BP Location: Left arm, Patient Position: Sitting, BP Cuff Size: Adult)   Pulse 56   Ht 1.778 m (5' 10\")   Wt 89.5 kg (197 lb 6.4 oz)   SpO2 98%   BMI 28.32 kg/m²     Physical Exam  Vitals reviewed.   Constitutional:       Appearance: Normal appearance. He is not ill-appearing.   HENT:      Head: Normocephalic and atraumatic.      Right Ear: Tympanic membrane normal.      Left Ear: Tympanic membrane normal.      Nose: Nose normal.      Mouth/Throat:      Mouth: Mucous membranes are moist.      Pharynx: Oropharynx is clear.   Eyes:      Extraocular Movements: Extraocular movements intact.      " Conjunctiva/sclera: Conjunctivae normal.      Pupils: Pupils are equal, round, and reactive to light.   Cardiovascular:      Rate and Rhythm: Normal rate and regular rhythm.   Pulmonary:      Effort: Pulmonary effort is normal.      Breath sounds: Normal breath sounds. No wheezing.   Abdominal:      General: There is no distension.      Palpations: Abdomen is soft. There is no mass.      Tenderness: There is no abdominal tenderness.   Musculoskeletal:      Cervical back: Neck supple.      Right lower leg: No edema.      Left lower leg: No edema.   Lymphadenopathy:      Cervical: No cervical adenopathy.   Neurological:      General: No focal deficit present.      Mental Status: He is alert and oriented to person, place, and time.      Gait: Gait normal.   Psychiatric:         Mood and Affect: Mood normal.         Behavior: Behavior normal.         Thought Content: Thought content normal.       Assessment/Plan   Problem List Items Addressed This Visit    None  Visit Diagnoses         Codes      Health care maintenance    -  Primary Z00.00    Relevant Orders    Comprehensive Metabolic Panel    Lipid Panel    CBC and Auto Differential      Screening for cardiovascular condition     Z13.6    Relevant Orders    Lipid Panel      Leukopenia, unspecified type     D72.819    Relevant Orders    CBC and Auto Differential               CPE completed.  Advised to keep a heart healthy, low fat  diet with fruits and veggies like Mediterranean diet.  Advised on the importance of exercise and maintaining 150 minutes of exercise per week (30 minutes per day 5 days a week).  Advised on regular eye and dental visits.  Discussed age appropriate cancer screening, immunizations and recommendations given.  Discussed avoiding illicit drugs and tobacco. Advised on appropriate use of alcohol.  Advised to wear seat belt.    Will get vaccines to me    Labs  CPE 1 year

## 2025-07-23 ENCOUNTER — HOSPITAL ENCOUNTER (OUTPATIENT)
Dept: RADIOLOGY | Facility: CLINIC | Age: 35
Discharge: HOME | End: 2025-07-23
Payer: COMMERCIAL

## 2025-07-23 ENCOUNTER — OFFICE VISIT (OUTPATIENT)
Dept: PRIMARY CARE | Facility: CLINIC | Age: 35
End: 2025-07-23
Payer: COMMERCIAL

## 2025-07-23 VITALS
SYSTOLIC BLOOD PRESSURE: 120 MMHG | DIASTOLIC BLOOD PRESSURE: 80 MMHG | HEART RATE: 86 BPM | WEIGHT: 199 LBS | OXYGEN SATURATION: 98 % | HEIGHT: 70 IN | TEMPERATURE: 98 F | BODY MASS INDEX: 28.49 KG/M2 | RESPIRATION RATE: 16 BRPM

## 2025-07-23 DIAGNOSIS — M25.571 ACUTE RIGHT ANKLE PAIN: Primary | ICD-10-CM

## 2025-07-23 DIAGNOSIS — M25.571 ACUTE RIGHT ANKLE PAIN: ICD-10-CM

## 2025-07-23 PROCEDURE — 73610 X-RAY EXAM OF ANKLE: CPT | Mod: RIGHT SIDE | Performed by: RADIOLOGY

## 2025-07-23 PROCEDURE — 3008F BODY MASS INDEX DOCD: CPT | Performed by: NURSE PRACTITIONER

## 2025-07-23 PROCEDURE — 73610 X-RAY EXAM OF ANKLE: CPT | Mod: RT

## 2025-07-23 PROCEDURE — 1036F TOBACCO NON-USER: CPT | Performed by: NURSE PRACTITIONER

## 2025-07-23 PROCEDURE — 99213 OFFICE O/P EST LOW 20 MIN: CPT | Performed by: NURSE PRACTITIONER

## 2025-07-23 ASSESSMENT — ENCOUNTER SYMPTOMS
FEVER: 0
VOMITING: 0
CHILLS: 0
DIARRHEA: 0
DIZZINESS: 0
FATIGUE: 0
TINGLING: 1
LOSS OF MOTION: 1
ARTHRALGIAS: 1
HEADACHES: 0
INABILITY TO BEAR WEIGHT: 0
COUGH: 0
LOSS OF SENSATION: 0
NUMBNESS: 0
MYALGIAS: 0
MUSCLE WEAKNESS: 1
NAUSEA: 0
PALPITATIONS: 0
CONSTIPATION: 0
SHORTNESS OF BREATH: 0

## 2025-07-23 NOTE — PROGRESS NOTES
"Subjective   Patient ID: Warren Atkinson is a 34 y.o. male who presents for Ankle Pain (Pt is here due to right ankle pain , Dr. Guzman pt ).    Right ankle pain x 1 week.   Twisted while running.   Continued with swelling, pain, and bruising.   Advil and brace helps with pain.   Walking worsens. Pain worse in AM.     Review of Systems   Constitutional:  Negative for chills, fatigue and fever.   Respiratory:  Negative for cough and shortness of breath.    Cardiovascular:  Negative for chest pain, palpitations and leg swelling.   Gastrointestinal:  Negative for constipation, diarrhea, nausea and vomiting.   Musculoskeletal:  Positive for arthralgias. Negative for myalgias.   Skin:  Negative for rash.   Neurological:  Negative for dizziness, numbness and headaches.   All other systems reviewed and are negative.        Objective   /80 (BP Location: Left arm, Patient Position: Sitting, BP Cuff Size: Adult)   Pulse 86   Temp 36.7 °C (98 °F) (Temporal)   Resp 16   Ht 1.778 m (5' 10\")   Wt 90.3 kg (199 lb)   SpO2 98%   BMI 28.55 kg/m²     Physical Exam  Vitals reviewed.   Constitutional:       Appearance: Normal appearance. He is normal weight.     Eyes:      Conjunctiva/sclera: Conjunctivae normal.       Cardiovascular:      Rate and Rhythm: Normal rate and regular rhythm.      Heart sounds: Normal heart sounds.   Pulmonary:      Effort: Pulmonary effort is normal.      Breath sounds: Normal breath sounds.     Musculoskeletal:         General: Swelling (with bruising, right ankle.) and tenderness (over distal fibula and medial, lateral ligaments of right ankle.) present. No deformity.      Cervical back: Neck supple.      Comments: Decreased ROM to right ankle.      Skin:     General: Skin is warm and dry.     Neurological:      General: No focal deficit present.      Mental Status: He is alert and oriented to person, place, and time.     Psychiatric:         Mood and Affect: Mood normal.         Assessment/Plan "   Assessment & Plan  Acute right ankle pain    Orders:    Referral to Orthopedics and Sports Medicine; Future    XR ankle right 3+ views; Future    - (+) right ankle swelling, tenderness, and bruising.   -xray to rule out fracture.   - Rest and elevate.   - Ice for no more than 20min 4-5 times per day.  - Compression with brace or ACE wrap for stabilization.   -Tylenol/Ibuprofen as needed for pain.   - ortho referral for further eval.  -Call or return to office as needed if these symptoms worsen or fail to improve as anticipated.

## 2025-07-31 ENCOUNTER — OFFICE VISIT (OUTPATIENT)
Dept: ORTHOPEDIC SURGERY | Facility: CLINIC | Age: 35
End: 2025-07-31
Payer: COMMERCIAL

## 2025-07-31 ENCOUNTER — HOSPITAL ENCOUNTER (OUTPATIENT)
Dept: RADIOLOGY | Facility: CLINIC | Age: 35
Discharge: HOME | End: 2025-07-31
Payer: COMMERCIAL

## 2025-07-31 DIAGNOSIS — M25.571 RIGHT ANKLE PAIN, UNSPECIFIED CHRONICITY: ICD-10-CM

## 2025-07-31 DIAGNOSIS — S93.401A MILD ANKLE SPRAIN, RIGHT, INITIAL ENCOUNTER: Primary | ICD-10-CM

## 2025-07-31 PROCEDURE — 73610 X-RAY EXAM OF ANKLE: CPT | Mod: RT

## 2025-07-31 PROCEDURE — 99211 OFF/OP EST MAY X REQ PHY/QHP: CPT | Performed by: PHYSICIAN ASSISTANT

## 2025-07-31 PROCEDURE — 73610 X-RAY EXAM OF ANKLE: CPT | Mod: RIGHT SIDE | Performed by: RADIOLOGY

## 2025-07-31 PROCEDURE — 99204 OFFICE O/P NEW MOD 45 MIN: CPT | Performed by: PHYSICIAN ASSISTANT

## 2025-07-31 NOTE — PATIENT INSTRUCTIONS
You can use an ASO brace in a good athletic shoe    1. Follow stretching exercises that were on a separate handout   2. Hold each stretch for a least 1 minute  3. Do not bounce while stretching  4. Stretch for 10 minutes at a time, 3x a day for 6 weeks then daily  5. Remember, it takes several weeks to a few months of consistent stretching to increase flexibility and decrease symptoms.     You can use OTC Voltaren gel or aspercream and apply it to the injured area.    Ice and elevate supported at the calf with no pressure on the heel to reduce swelling.    You can use a bucket of room temperature water with Epson salt and do your ankle/toe exercises    The patient was given a prescription for physical therapy.  Physical therapy is medically necessary to improve strength, balance, range of motion and functional outcomes after injury and/or surgery.    Patient will avoid impact activities such as running or jumping.  They can use a stationary bike, pool exercises and upper body training.    Follow up in 4-6 weeks or as needed

## 2025-07-31 NOTE — PROGRESS NOTES
History of Present Illness  35 y.o.male here for right ankle pain  1. Mild ankle sprain, right, initial encounter  Referral to Physical Therapy      2. Right ankle pain, unspecified chronicity  XR ankle right 3+ views        Mechanism of injury: ankle inversion while running and stepping off side of sidewalk  Date of Injury/Pain: ~ 2 weeks  Location of pain: lateral ankle  Frequency of Pain: worse with walking or standing  Associated symptoms? Swelling.  Previous treatment?  UC, xray, ankle wrap    Medical History[1]     Allergies[2]     Surgical History[3]     Family History[4]     Social History     Socioeconomic History    Marital status: Single     Spouse name: Not on file    Number of children: Not on file    Years of education: Not on file    Highest education level: Not on file   Occupational History    Not on file   Tobacco Use    Smoking status: Never    Smokeless tobacco: Never   Vaping Use    Vaping status: Never Used   Substance and Sexual Activity    Alcohol use: Yes     Alcohol/week: 2.0 standard drinks of alcohol     Types: 2 Cans of beer per week     Comment: socially    Drug use: Never    Sexual activity: Yes     Partners: Female     Birth control/protection: Coitus interruptus, OCP   Other Topics Concern    Not on file   Social History Narrative    Not on file     Social Drivers of Health     Financial Resource Strain: Not on file   Food Insecurity: Not on file   Transportation Needs: Not on file   Physical Activity: Not on file   Stress: Not on file   Social Connections: Not on file   Intimate Partner Violence: Not on file   Housing Stability: Not on file        CURRENT MEDICATIONS:   Current Medications[5]     REVIEW OF SYSTEMS  27 point review of systems negative except what is stated in HPI    PHYSICAL EXAM  Constitutional Exam: patient's height and weight reviewed, well-kempt  Psychiatric Exam: alert and oriented x 3, appropriate mood and behavior  Eye Exam: DENYS IVY  Pulmonary Exam:  breathing non-labored, no apparent distress  Lymphatic exam: no appreciable lymphadenopathy in the lower extremities  Cardiovascular exam: DP pulses 2+ bilaterally, PT 2+ bilaterally, toes are pink with good capillary refill, no pitting edema  Skin exam: no open lesions, rashes, abrasions or ulcerations  Neurological exam: sensation to light touch intact in both lower extremities in peripheral and dermatomal distributions (except for any abnormalities noted in musculoskeletal exam)    MUSCULOSKELETAL  EXAM  On examination of the right ankle/foot:  Normal gait  Normal alignment  Minimal swelling of the ankle. No ecchymosis or erythema  Normal ROM in plantarflexion, dorsiflexion, inversion and eversion  Normal strength in plantarflexion, dorsiflexion, inversion and eversion.  Tenderness to palpation: ATFL and none over lateral malleolus  No tenderness to palpation over the Achilles, medial malleolus, posterior tibial tendon, peroneal tendon, base of fifth metatarsal, deltoid ligament, talus or navicular.  Neurovascularly intact.  No sensory deficit to light touch.  Dorsalis pedis and posterior tibial pulses 2+ bilaterally.  Negative proprioception testing.   - Perez's test                              - Dorsiflexion-eversion test  Trace Anterior Drawer test                        - Talar tilt test  - Squeeze test     IMAGING  I personally reviewed the patient's x-ray images and reports of the right ankle. The xrays show no fractures or dislocation.  Normal joint spacing         ASSESSMENT: right ankle sprain grade II    PLAN: I discussed with the patient the differential diagnosis, complex overuse and degenerative related nature of the condition(s) and available treatment option(s). Patient can use an ASO brace for support.  They were given brace instructions. The patient was given a prescription for physical therapy.  Physical therapy is medically necessary to improve strength, balance, range of motion and  functional outcomes after injury and/or surgery. Patient was given a handout and instructed on an at home stretching program.  They should do these exercises 3 times per day for 6 weeks and then daily. Patient can use OTC aspercream for pain and continue to ice and elevate supported at the calf to reduce swelling. All the patient's questions were answered. The patient agrees with the above plan.  Follow up in 4-6 weeks or as needed      Chris Amanda PA-C         [1]   Past Medical History:  Diagnosis Date    Eczema    [2] No Known Allergies  [3]   Past Surgical History:  Procedure Laterality Date    WISDOM TOOTH EXTRACTION     [4]   Family History  Problem Relation Name Age of Onset    Other (brain tumor) Mother          non cancerous    No Known Problems Father      No Known Problems Sister      No Known Problems Brother      No Known Problems Mother's Sister      No Known Problems Mother's Brother      No Known Problems Father's Sister      No Known Problems Father's Brother      No Known Problems Maternal Grandmother      No Known Problems Maternal Grandfather      No Known Problems Paternal Grandmother      No Known Problems Paternal Grandfather     [5]   No current outpatient medications on file.     No current facility-administered medications for this visit.

## 2025-08-07 ENCOUNTER — EVALUATION (OUTPATIENT)
Dept: PHYSICAL THERAPY | Facility: CLINIC | Age: 35
End: 2025-08-07
Payer: COMMERCIAL

## 2025-08-07 DIAGNOSIS — M25.571 RIGHT ANKLE PAIN: Primary | ICD-10-CM

## 2025-08-07 PROCEDURE — 97161 PT EVAL LOW COMPLEX 20 MIN: CPT | Mod: GP | Performed by: PHYSICAL THERAPIST

## 2025-08-07 PROCEDURE — 97110 THERAPEUTIC EXERCISES: CPT | Mod: GP | Performed by: PHYSICAL THERAPIST

## 2025-08-07 NOTE — PROGRESS NOTES
Physical Therapy    Physical Therapy Evaluation and Treatment      Patient Name: Warren Atkinson  MRN: [id number]  Today's Date: 2025    Time Entry: Start time: 242            End time:     Reason for Visit  Referred Dx:  Referred By:     Insurance Information  Visit #:   Approved Visits:   Auth required:    Insurance:  Medicare Certification Date Range: ***/***/*** to ***/***/***     : verified with patient        Subjective      Chief complaint: pain in his right ankle, walking on flat ground is ok, hurts walking on uneven surface, pain and pain while wakling on a level surface after 10 m    Onset: 3wks    Onset: Running and twisted his right ankle  [place]: The patient pstates theat he was running, stepped on to side walk , twisted his right ankle before 3 wks. The patient states it wass swollen and bruised for coupel of the weks. He was travelling , didn't do much at the time . After coming back he saw the doc before 2 wks . Thre doc ordered tthe x-ray and said he miight twisted a tendon was gven a brace and advissed to avoid running, jumping for 1 - 2 months and referred him for a PT.     PMHx:    Social/Environmental Factors:    PLOF: independent without restrictions    Medications: see chart for full medication list     Medical Screening: Patient denies bowel/bladder changes, pulsatile mass in abdomen, recent infection/illness, calf pain, headaches, chest pain, SOB, redness/warmth/swelling in LE     Functional Limitations:    Patient goals for PT: The patient states that he wants to get back to running and able to do other sy    Precautions:     Pain:     At Best: /10  At Worst: /10  Aggravating Factors:  Relieving factors:   Home Living:     Prior Level of Function:       Objective     Observation:  Posture: The patient stands with wt shift to LLE     Palpation:   TTP:   Spasm/Tightness:  Examination:    Functional Performance:   DL HR:   SL HR: R: L:  DL Squat:  able to perform 90* ankle and [id number] hip  flexion, ankle raises adter 70* knee flexion, unstaedy anf painful right  ankle and foot while performing, wt shift to LLE   SL Squat:R: painful and unsteady R side, L: anle to perform with hip 90 and knee 80* flexion decreased hip ext control , overactiv hip flexors   SLS: R:  L:    Step up/down:   Lateral step down:    ROM:  Hip ROM:    Flexion:   Extension:  Abduction:   IR:   ER:  Knee ROM:    Flexion:   Extension:  Ankle ROM:   DF: R: L:   PF: R: L:   Inv: R: L:   EV: R: L:  MMT:  Ankle MMT:  DF: R:/5 L:/5   PF: R:/5 L:/5   Inv: R:/5 L:/5   Ev: R:/5 L:/5  Knee MMT:   Flex: R:/5 L:/5   Ext: R:/5 L:/5  Hip MMT:   Hip Flexion: R:/5 L:/5   Prone hip extension: R:/5 L:/5   Hip abduction: R:/5 L:/5   Hip IR: R:/5 L:/5   Hip ER:R:/5 L:/5     Special Tests:   Slump Test:   SLR:    L:    R:   Femoral Nerve Test:   Prone Instability:   Anterior Drawer:   Reverse Anterior Drawer:   Medial Talar Tilt:   Lateral Talar Tilt:   ER/DF Test:   Syndesmosis Squeeze Test:   Cotton Test:   Perez Test:   Royal Tyler test:   Arc Sign:   Tinel sign:  Neuro Exam:  Dermatome exam:  Myotome exam:  Reflexes:    Stairs negotiation:  Gait Analysis:     Joint Play Assessment:  Repeated Movements:      Outcome Measures:  {PT Outcome Measures:69733}       Assessment:       The  aptient presents with L quads jesus hi abd weakness , hamstrings and claf tightness and decreased eccentrinc ankle control RLE w  Patient presents with chief complaint of L/R ankle pain that has been present since    .Patient notes that    tends to aggravate his/her sxs. Patient demonstrates decreased ankle ROM, decreased ankle strength, impaired balance/ankle proprioception, decreased functional strength, impaired gait quality, and increased ankle pain, which limits his/her functional ability. Patient would likely benefit from skilled outpatient PT in order to address the above deficits and return to PLOF.     Plan:        EDUCATION:       Goals:  Patient will  demonstrate improvement in LEFS score by at least 9 points in order to meet MCID  Patient will demonstrate full ankle AROM without pain or compensation  Patient will demonstrate at least 4+/5 ankle and glute med/max strength in all planes  Patient will demonstrate I with HEP  Patient will be able to ambulate community distances without pain or compensation  Patient will be able to negotiate 1 flight of stairs reciprocally without pain or compensation  Patient will be able to perform 15 DL/SL HR without pain or compensation  Patient will be able to return to    without pain or compensation     Treatment today:   Initail Eval: ( ***  :  *** min  : ***units), There Ex: ( ***  :  *** min  : ***units)    1. Initial evaluation   2. Pt ed on diagnosis, prognosis, goals of PT, expectations   3. There Ex:    4. HEP (see below) ed on normal vs abnormal response    HEP

## 2025-08-07 NOTE — PROGRESS NOTES
Physical Therapy Evaluation and Treatment    Patient Name: Warren Atkinson  MRN: [id number]  YOB: 1990  Encounter Date: 8/7/2025    Reason for Visit  Referred Dx:Mild ankle sprain, right, initial encounter [S93.401A]   Referred By:  Chris Amanda PA-C (7/31/2025)     Time Entry:   In :                          Rehab Insurance Information:   Visit Count: 1  Auth Required: No  Authorized Visits: 50        Additional Authorization/Insurance Information:  [employer] / NO AUTH / $35 COPAY / 50 VISITS PT,OT,SP / $7500 OOP / [employer] # [id number]    Rehab Falls Risk Assessment:       Problem List Items Addressed This Visit    None           Subjective      History of Present Illness  Warren is a 35 y.o. male                  History of Present Condition/Illness: The patient states he twisted his right ankle while running, stepped on a side walk and twisted his right ankle     Activities of Daily Living  Social history was obtained from Patient.               Previously independent with activities of daily living? Yes     Currently independent with activities of daily living? Yes          Previously independent with instrumental activities of daily living? Yes                    Pain     Patient reports a current pain level of 1/10. Pain at best is reported as 1/10. Pain at worst is reported as 4/10.   Location: Right Ankle  Clinical Progression (since onset): Stable  Pain Qualities: Sharp, Aching, Discomfort  Pain-Relieving Factors: Rest, Medications - over-the-counter, Ice  Pain-Aggravating Factors: Stair climbing, Walking         Living Arrangements  Living Situation  Housing: Home independently  Living Arrangements: Family members    Home Setup  Type of Structure: House  Primary Bedroom: 1st floor  Primary Bathroom: 1st floor  Bathroom Toilet: Standard  Kitchen: 1st floor  Laundry: Basement           Objective                 Activities                                                           Assessment/Plan          Physical Therapy    Physical Therapy Evaluation and Treatment      Patient Name: Warren Atkinson  MRN: [id number]  Today's Date: 2025    Time Entry: Start time: 242            End time:     Reason for Visit  Referred Dx:  Referred By:     Insurance Information  Visit #:   Approved Visits:   Auth required:    Insurance:  Medicare Certification Date Range: ***/***/*** to ***/***/***     : verified with patient        Subjective      Chief complaint: pain in his right ankle, walking on flat ground is ok, hurts walking on uneven surface, pain and pain while wakling on a level surface after 10 m    Onset: 3wks    Onset: Running and twisted his right ankle  [place]: The patient pstates theat he was running, stepped on to side walk , twisted his right ankle before 3 wks. The patient states it wass swollen and bruised for coupel of the weks. He was travelling , didn't do much at the time . After coming back he saw the doc before 2 wks . Thre doc ordered tthe x-ray and said he miight twisted a tendon was gven a brace and advissed to avoid running, jumping for 1 - 2 months and referred him for a PT.     PMHx:    Social/Environmental Factors:    PLOF: independent without restrictions    Medications: see chart for full medication list     Medical Screening: Patient denies bowel/bladder changes, pulsatile mass in abdomen, recent infection/illness, calf pain, headaches, chest pain, SOB, redness/warmth/swelling in LE     Functional Limitations:    Patient goals for PT: The patient states that he wants to get back to running and able to do other sy    Precautions:     Pain:     At Best: /10  At Worst: /10  Aggravating Factors:  Relieving factors:   Home Living:     Prior Level of Function:       Objective     Observation:  Posture: The patient stands with wt shift to LLE     Palpation:   TTP:   Spasm/Tightness:  Examination:    Functional Performance:   DL HR:   SL HR: R: L:  DL Squat:  able to perform 90* ankle and [id number]  hip flexion, ankle raises adter 70* knee flexion, unstaedy anf painful right  ankle and foot while performing, wt shift to LLE   SL Squat:R: painful and unsteady R side, L: anle to perform with hip 90 and knee 80* flexion decreased hip ext control , overactiv hip flexors   SLS: R:  L:    Step up/down:   Lateral step down:    ROM:  Hip ROM:    Flexion:   Extension:  Abduction:   IR:   ER:  Knee ROM:    Flexion:   Extension:  Ankle ROM:   DF: R: L:   PF: R: L:   Inv: R: L:   EV: R: L:  MMT:  Ankle MMT:  DF: R:/5 L:/5   PF: R:/5 L:/5   Inv: R:/5 L:/5   Ev: R:/5 L:/5  Knee MMT:   Flex: R:/5 L:/5   Ext: R:/5 L:/5  Hip MMT:   Hip Flexion: R:/5 L:/5   Prone hip extension: R:/5 L:/5   Hip abduction: R:/5 L:/5   Hip IR: R:/5 L:/5   Hip ER:R:/5 L:/5     Special Tests:   Slump Test:   SLR:    L:    R:   Femoral Nerve Test:   Prone Instability:   Anterior Drawer:   Reverse Anterior Drawer:   Medial Talar Tilt:   Lateral Talar Tilt:   ER/DF Test:   Syndesmosis Squeeze Test:   Cotton Test:   Perez Test:   Royal Tyler test:   Arc Sign:   Tinel sign:  Neuro Exam:  Dermatome exam:  Myotome exam:  Reflexes:    Stairs negotiation:  Gait Analysis:     Joint Play Assessment:  Repeated Movements:      Outcome Measures:  {PT Outcome Measures:74355}       Assessment:       The  aptient presents with L quads jesus hi abd weakness , hamstrings and claf tightness and decreased eccentrinc ankle control RLE w  Patient presents with chief complaint of L/R ankle pain that has been present since    .Patient notes that    tends to aggravate his/her sxs. Patient demonstrates decreased ankle ROM, decreased ankle strength, impaired balance/ankle proprioception, decreased functional strength, impaired gait quality, and increased ankle pain, which limits his/her functional ability. Patient would likely benefit from skilled outpatient PT in order to address the above deficits and return to PLOF.     Plan:        EDUCATION:       Goals:  Patient will  demonstrate improvement in LEFS score by at least 9 points in order to meet MCID  Patient will demonstrate full ankle AROM without pain or compensation  Patient will demonstrate at least 4+/5 ankle and glute med/max strength in all planes  Patient will demonstrate I with HEP  Patient will be able to ambulate community distances without pain or compensation  Patient will be able to negotiate 1 flight of stairs reciprocally without pain or compensation  Patient will be able to perform 15 DL/SL HR without pain or compensation  Patient will be able to return to    without pain or compensation     Treatment today:   Initail Eval: ( ***  :  *** min  : ***units), There Ex: ( ***  :  *** min  : ***units)    1. Initial evaluation   2. Pt ed on diagnosis, prognosis, goals of PT, expectations   3. There Ex:    4. HEP (see below) ed on normal vs abnormal response    HEP         compensation  Patient will be able to return to running without pain or compensation   Treatment today:   Initail Eval: ( 40025 :  25 min  : 1 units), There Ex: ( 16234  :  20 min  : 1unit)  1. Initial evaluation   2. Pt ed on diagnosis, prognosis, goals of PT, expectations   3. There Ex:  Clamshells against resistance, BLE x 10 x 3s  Hamstrings stretch , seated , BLE x 3 x 10s  Gastroc stretch against wall, BLE x 3 x 10s  Soleus stretch against wall , BLE x 3 x 10s  4. HEP (see below) ed on normal vs abnormal response

## 2025-08-13 ASSESSMENT — PAIN SCALES - GENERAL: PAINLEVEL_OUTOF10: 1

## 2025-08-13 ASSESSMENT — PAIN DESCRIPTION - DESCRIPTORS: DESCRIPTORS: ACHING;DISCOMFORT;SHARP

## 2025-08-13 ASSESSMENT — PAIN - FUNCTIONAL ASSESSMENT: PAIN_FUNCTIONAL_ASSESSMENT: 0-10

## 2025-09-04 ENCOUNTER — TREATMENT (OUTPATIENT)
Dept: PHYSICAL THERAPY | Facility: CLINIC | Age: 35
End: 2025-09-04
Payer: COMMERCIAL

## 2025-09-04 DIAGNOSIS — M25.571 ACUTE RIGHT ANKLE PAIN: Primary | ICD-10-CM

## 2025-09-04 DIAGNOSIS — M25.571 RIGHT ANKLE PAIN: ICD-10-CM

## 2025-09-04 PROCEDURE — 97110 THERAPEUTIC EXERCISES: CPT | Mod: GP | Performed by: PHYSICAL THERAPIST

## 2025-09-04 PROCEDURE — 97140 MANUAL THERAPY 1/> REGIONS: CPT | Mod: GP | Performed by: PHYSICAL THERAPIST
